# Patient Record
(demographics unavailable — no encounter records)

---

## 2024-12-19 NOTE — ASSESSMENT
[FreeTextEntry1] : sinus pressure/pain- discussed ENT versus CT sinus she will see ENT first fu 6 months

## 2024-12-19 NOTE — HISTORY OF PRESENT ILLNESS
[Never] : never [TextBox_4] : BREANN PERALTA is a 52 year old female who presents for keanu fu  PMH: HTN HST-   16/hr  on autocpap  using machine  still with chronic nasal congestion nasal mask

## 2024-12-19 NOTE — PROCEDURE
[FreeTextEntry1] : cpap compliance data dec reviewed  previous data reviewed:  compliance data reviewed : 3/2024-6/2024 % days used :  93 % avg use > 4 hours: 76%   12/2023 HST 16/hr desat 76%

## 2024-12-19 NOTE — PHYSICAL EXAM
[No Acute Distress] : no acute distress [Well Nourished] : well nourished [Well Developed] : well developed [Enlarged Base of the Tongue] : enlarged base of the tongue [No Resp Distress] : no resp distress [No Acc Muscle Use] : no acc muscle use [Clear to Auscultation Bilaterally] : clear to auscultation bilaterally [No Focal Deficits] : no focal deficits [Oriented x3] : oriented x3

## 2025-06-26 NOTE — HISTORY OF PRESENT ILLNESS
[Never] : never [TextBox_4] : BREANN PERALTA is a 54 year old female who presents for keanu fu  PMH: HTN HST-   16/hr  on autocpap  using machine    Use nasal mask becomes itchy She feels air is too warm

## 2025-06-26 NOTE — PROCEDURE
[FreeTextEntry1] : cpap compliance data dec reviewed 90 days   previous data reviewed:  compliance data reviewed : 3/2024-6/2024 % days used :  93 % avg use > 4 hours: 76%   12/2023 HST 16/hr desat 76%